# Patient Record
Sex: MALE | Race: BLACK OR AFRICAN AMERICAN | NOT HISPANIC OR LATINO | Employment: UNEMPLOYED | URBAN - METROPOLITAN AREA
[De-identification: names, ages, dates, MRNs, and addresses within clinical notes are randomized per-mention and may not be internally consistent; named-entity substitution may affect disease eponyms.]

---

## 2023-10-24 DIAGNOSIS — M25.521 PAIN IN RIGHT ELBOW: Primary | ICD-10-CM

## 2023-11-01 ENCOUNTER — OFFICE VISIT (OUTPATIENT)
Dept: ORTHOPEDICS | Facility: CLINIC | Age: 38
End: 2023-11-01
Payer: MEDICARE

## 2023-11-01 ENCOUNTER — HOSPITAL ENCOUNTER (OUTPATIENT)
Dept: RADIOLOGY | Facility: HOSPITAL | Age: 38
Discharge: HOME | End: 2023-11-01
Attending: STUDENT IN AN ORGANIZED HEALTH CARE EDUCATION/TRAINING PROGRAM
Payer: MEDICARE

## 2023-11-01 VITALS
WEIGHT: 255.38 LBS | HEART RATE: 57 BPM | SYSTOLIC BLOOD PRESSURE: 156 MMHG | TEMPERATURE: 98 F | OXYGEN SATURATION: 100 % | RESPIRATION RATE: 20 BRPM | HEIGHT: 73 IN | BODY MASS INDEX: 33.85 KG/M2 | DIASTOLIC BLOOD PRESSURE: 96 MMHG

## 2023-11-01 DIAGNOSIS — M25.521 PAIN IN RIGHT ELBOW: ICD-10-CM

## 2023-11-01 PROCEDURE — 99213 OFFICE O/P EST LOW 20 MIN: CPT | Mod: PBBFAC

## 2023-11-01 PROCEDURE — 3008F BODY MASS INDEX DOCD: CPT | Mod: CPTII,,, | Performed by: ORTHOPAEDIC SURGERY

## 2023-11-01 PROCEDURE — 73080 X-RAY EXAM OF ELBOW: CPT | Mod: TC,RT

## 2023-11-01 PROCEDURE — 3080F PR MOST RECENT DIASTOLIC BLOOD PRESSURE >= 90 MM HG: ICD-10-PCS | Mod: CPTII,,, | Performed by: ORTHOPAEDIC SURGERY

## 2023-11-01 PROCEDURE — 1159F MED LIST DOCD IN RCRD: CPT | Mod: CPTII,,, | Performed by: ORTHOPAEDIC SURGERY

## 2023-11-01 PROCEDURE — 3008F PR BODY MASS INDEX (BMI) DOCUMENTED: ICD-10-PCS | Mod: CPTII,,, | Performed by: ORTHOPAEDIC SURGERY

## 2023-11-01 PROCEDURE — 99499 UNLISTED E&M SERVICE: CPT | Mod: S$PBB,,, | Performed by: ORTHOPAEDIC SURGERY

## 2023-11-01 PROCEDURE — 1159F PR MEDICATION LIST DOCUMENTED IN MEDICAL RECORD: ICD-10-PCS | Mod: CPTII,,, | Performed by: ORTHOPAEDIC SURGERY

## 2023-11-01 PROCEDURE — 3077F SYST BP >= 140 MM HG: CPT | Mod: CPTII,,, | Performed by: ORTHOPAEDIC SURGERY

## 2023-11-01 PROCEDURE — 99499 NO LOS: ICD-10-PCS | Mod: S$PBB,,, | Performed by: ORTHOPAEDIC SURGERY

## 2023-11-01 PROCEDURE — 3080F DIAST BP >= 90 MM HG: CPT | Mod: CPTII,,, | Performed by: ORTHOPAEDIC SURGERY

## 2023-11-01 PROCEDURE — 3077F PR MOST RECENT SYSTOLIC BLOOD PRESSURE >= 140 MM HG: ICD-10-PCS | Mod: CPTII,,, | Performed by: ORTHOPAEDIC SURGERY

## 2023-11-01 NOTE — PROGRESS NOTES
[facility]  Established Patient Office Visit  11/01/2023       Patient ID: Manoj Velez  YOB: 1985  MRN: [id number]    Chief Complaint: Injury and Pain of the Right Elbow (Fell 10/23/23 in a store, no c/o pain elbow is tender and sore, here for initial visit )      Past Orthopaedic Surgeries:  None    HPI:  Manoj Velez is a 37 y.o. male who fell 10/23/2023 he slipped at a gas station and hit his right elbow on the floor.  He is now involved in litigation with [employer].  He was placed in a long-arm splint.  Today he no longer has any pain.  He has full pronation supination without any mechanical block.  He is missing about 10° of extension likely due to some stiffness from being in a splint.  No new falls or injuries no paresthesias in the hand.    Past Medical History:    Past Medical History:   Diagnosis Date    Known health problems: none      Past Surgical History:   Procedure Laterality Date    APPENDECTOMY Right      History reviewed. No pertinent family history.  Social History     Socioeconomic History    Marital status: Single   Tobacco Use    Smoking status: Never    Smokeless tobacco: Never   Substance and Sexual Activity    Alcohol use: Yes    Drug use: Yes     Types: Marijuana       Review of patient's allergies indicates:  No Known Allergies    Physical Exam:    Right elbow   No tenderness to palpation of the olecranon, no tenderness to palpation at the radial head   Range of motion from 10° to 130°   Full pronation supination without any mechanical block  Motor intact AIN PIN ulnar nerve  Sensation intact to median ulnar radial nerve  2+ radial pulse    Imaging:  X-rays show reduced radiocapitellar joint, reduced ulnar humeral joint there are no fractures of the distal humerus, there are no fractures noted of the olecranon, there is a possible very minimal nondisplaced fracture of the lateral aspect of the radial head    Assessment and Plan:    Manoj Velez  is a 37 y.o. male with right elbow pain with possible nondisplaced minimal fracture of the radial head.  However patient has full pronation and supination without any mechanical block and nearly no pain.  -weightbearing as tolerated   -work on range of motion to get full elbow extension back   Follow up as needed    Demetris Quintero MD  [employer] Orthopaedic Surgery PGY-3          Orders Placed This Encounter    X-Ray Elbow Complete Right

## 2023-11-01 NOTE — PROGRESS NOTES
Faculty Attestation: Manoj Velez  was seen at [facility] in the Orthopaedic Clinic. Patient chart reviewed. History of Present Illness, Physical Exam, and Assessment and Plan reviewed. Treatment plan is reasonable and appropriate. Compliance with treatment recommendations is important. No procedure was performed.     Andreas Galarza MD  Orthopaedic Surgery